# Patient Record
Sex: MALE | Race: WHITE | NOT HISPANIC OR LATINO | Employment: UNEMPLOYED | URBAN - METROPOLITAN AREA
[De-identification: names, ages, dates, MRNs, and addresses within clinical notes are randomized per-mention and may not be internally consistent; named-entity substitution may affect disease eponyms.]

---

## 2018-10-09 ENCOUNTER — HOSPITAL ENCOUNTER (EMERGENCY)
Facility: HOSPITAL | Age: 43
Discharge: HOME | End: 2018-10-09
Attending: EMERGENCY MEDICINE

## 2018-10-09 VITALS
TEMPERATURE: 97 F | BODY MASS INDEX: 27.09 KG/M2 | HEIGHT: 72 IN | OXYGEN SATURATION: 100 % | RESPIRATION RATE: 16 BRPM | HEART RATE: 80 BPM | SYSTOLIC BLOOD PRESSURE: 124 MMHG | DIASTOLIC BLOOD PRESSURE: 86 MMHG | WEIGHT: 200 LBS

## 2018-10-09 DIAGNOSIS — R11.2 NON-INTRACTABLE VOMITING WITH NAUSEA, UNSPECIFIED VOMITING TYPE: Primary | ICD-10-CM

## 2018-10-09 DIAGNOSIS — F10.10 ETOH ABUSE: ICD-10-CM

## 2018-10-09 LAB
ALBUMIN SERPL BCP-MCNC: 4.1 G/DL
ALP SERPL-CCNC: 134 U/L
ALT SERPL W/O P-5'-P-CCNC: 45 U/L
ANION GAP SERPL CALC-SCNC: 12 MMOL/L
AST SERPL-CCNC: 123 U/L
BASOPHILS # BLD AUTO: 0.02 K/UL
BASOPHILS NFR BLD: 0.3 %
BILIRUB SERPL-MCNC: 1.8 MG/DL
BUN SERPL-MCNC: 9 MG/DL
CALCIUM SERPL-MCNC: 9.2 MG/DL
CHLORIDE SERPL-SCNC: 98 MMOL/L
CO2 SERPL-SCNC: 23 MMOL/L
CREAT SERPL-MCNC: 0.8 MG/DL
DIFFERENTIAL METHOD: ABNORMAL
EOSINOPHIL # BLD AUTO: 0 K/UL
EOSINOPHIL NFR BLD: 0.1 %
ERYTHROCYTE [DISTWIDTH] IN BLOOD BY AUTOMATED COUNT: 12.3 %
EST. GFR  (AFRICAN AMERICAN): >60 ML/MIN/1.73 M^2
EST. GFR  (NON AFRICAN AMERICAN): >60 ML/MIN/1.73 M^2
ETHANOL SERPL-MCNC: <10 MG/DL
GLUCOSE SERPL-MCNC: 135 MG/DL
HCT VFR BLD AUTO: 43 %
HGB BLD-MCNC: 15.5 G/DL
IMM GRANULOCYTES # BLD AUTO: 0.02 K/UL
IMM GRANULOCYTES NFR BLD AUTO: 0.3 %
LIPASE SERPL-CCNC: 35 U/L
LYMPHOCYTES # BLD AUTO: 0.9 K/UL
LYMPHOCYTES NFR BLD: 10.8 %
MAGNESIUM SERPL-MCNC: 1.9 MG/DL
MCH RBC QN AUTO: 32.2 PG
MCHC RBC AUTO-ENTMCNC: 36 G/DL
MCV RBC AUTO: 89 FL
MONOCYTES # BLD AUTO: 0.5 K/UL
MONOCYTES NFR BLD: 5.7 %
NEUTROPHILS # BLD AUTO: 6.5 K/UL
NEUTROPHILS NFR BLD: 82.8 %
NRBC BLD-RTO: 0 /100 WBC
PHOSPHATE SERPL-MCNC: 2.2 MG/DL
PLATELET # BLD AUTO: 131 K/UL
PMV BLD AUTO: 10.1 FL
POTASSIUM SERPL-SCNC: 3.3 MMOL/L
PROT SERPL-MCNC: 8.4 G/DL
RBC # BLD AUTO: 4.82 M/UL
SODIUM SERPL-SCNC: 133 MMOL/L
WBC # BLD AUTO: 7.86 K/UL

## 2018-10-09 PROCEDURE — 84100 ASSAY OF PHOSPHORUS: CPT

## 2018-10-09 PROCEDURE — 83735 ASSAY OF MAGNESIUM: CPT

## 2018-10-09 PROCEDURE — 80053 COMPREHEN METABOLIC PANEL: CPT

## 2018-10-09 PROCEDURE — 96365 THER/PROPH/DIAG IV INF INIT: CPT

## 2018-10-09 PROCEDURE — 80320 DRUG SCREEN QUANTALCOHOLS: CPT

## 2018-10-09 PROCEDURE — 63600175 PHARM REV CODE 636 W HCPCS: Performed by: PHYSICIAN ASSISTANT

## 2018-10-09 PROCEDURE — 25000003 PHARM REV CODE 250: Performed by: PHYSICIAN ASSISTANT

## 2018-10-09 PROCEDURE — 96375 TX/PRO/DX INJ NEW DRUG ADDON: CPT

## 2018-10-09 PROCEDURE — 83690 ASSAY OF LIPASE: CPT

## 2018-10-09 PROCEDURE — 99282 EMERGENCY DEPT VISIT SF MDM: CPT | Mod: ,,, | Performed by: EMERGENCY MEDICINE

## 2018-10-09 PROCEDURE — 85025 COMPLETE CBC W/AUTO DIFF WBC: CPT

## 2018-10-09 PROCEDURE — 99284 EMERGENCY DEPT VISIT MOD MDM: CPT | Mod: 25

## 2018-10-09 RX ORDER — GABAPENTIN 300 MG/1
CAPSULE ORAL
Qty: 10 CAPSULE | Refills: 0 | Status: SHIPPED | OUTPATIENT
Start: 2018-10-09 | End: 2018-10-13

## 2018-10-09 RX ORDER — GABAPENTIN 300 MG/1
CAPSULE ORAL
Qty: 10 CAPSULE | Refills: 0 | Status: SHIPPED | OUTPATIENT
Start: 2018-10-09 | End: 2018-10-09 | Stop reason: SDUPTHER

## 2018-10-09 RX ORDER — ONDANSETRON 2 MG/ML
4 INJECTION INTRAMUSCULAR; INTRAVENOUS
Status: COMPLETED | OUTPATIENT
Start: 2018-10-09 | End: 2018-10-09

## 2018-10-09 RX ORDER — GABAPENTIN 300 MG/1
300 CAPSULE ORAL ONCE
Status: COMPLETED | OUTPATIENT
Start: 2018-10-09 | End: 2018-10-09

## 2018-10-09 RX ORDER — POTASSIUM CHLORIDE 20 MEQ/15ML
20 SOLUTION ORAL
Status: DISCONTINUED | OUTPATIENT
Start: 2018-10-09 | End: 2018-10-09

## 2018-10-09 RX ORDER — SODIUM,POTASSIUM PHOSPHATES 280-250MG
1 POWDER IN PACKET (EA) ORAL ONCE
Status: COMPLETED | OUTPATIENT
Start: 2018-10-09 | End: 2018-10-09

## 2018-10-09 RX ORDER — DIAZEPAM 5 MG/ML
5 INJECTION, SOLUTION INTRAMUSCULAR; INTRAVENOUS
Status: COMPLETED | OUTPATIENT
Start: 2018-10-09 | End: 2018-10-09

## 2018-10-09 RX ORDER — ONDANSETRON 8 MG/1
8 TABLET, ORALLY DISINTEGRATING ORAL EVERY 6 HOURS PRN
Qty: 15 TABLET | Refills: 0 | Status: SHIPPED | OUTPATIENT
Start: 2018-10-09

## 2018-10-09 RX ORDER — ONDANSETRON 8 MG/1
8 TABLET, ORALLY DISINTEGRATING ORAL EVERY 6 HOURS PRN
Qty: 15 TABLET | Refills: 0 | Status: SHIPPED | OUTPATIENT
Start: 2018-10-09 | End: 2018-10-09 | Stop reason: SDUPTHER

## 2018-10-09 RX ADMIN — DIAZEPAM 5 MG: 5 INJECTION, SOLUTION INTRAMUSCULAR; INTRAVENOUS at 01:10

## 2018-10-09 RX ADMIN — POTASSIUM & SODIUM PHOSPHATES POWDER PACK 280-160-250 MG 1 PACKET: 280-160-250 PACK at 04:10

## 2018-10-09 RX ADMIN — THERA TABS 1 TABLET: TAB at 03:10

## 2018-10-09 RX ADMIN — SODIUM CHLORIDE 1000 ML: 0.9 INJECTION, SOLUTION INTRAVENOUS at 01:10

## 2018-10-09 RX ADMIN — ONDANSETRON 4 MG: 2 INJECTION INTRAMUSCULAR; INTRAVENOUS at 01:10

## 2018-10-09 RX ADMIN — GABAPENTIN 300 MG: 300 CAPSULE ORAL at 05:10

## 2018-10-09 RX ADMIN — THIAMINE HYDROCHLORIDE 100 MG: 100 INJECTION, SOLUTION INTRAMUSCULAR; INTRAVENOUS at 04:10

## 2018-10-09 NOTE — ED NOTES
Pharmacy contacted of pt. Multivitamin not loaded in pyxis. Acknowledged and pharmacist states will fix problem.

## 2018-10-09 NOTE — ED PROVIDER NOTES
Encounter Date: 10/9/2018       History     Chief Complaint   Patient presents with    Nausea     Pt daily drinker of Etoh. Pt's last drink was noon yesterday. Pt has nausea/emesis/shakes.     42-year-old male with hepatitis-C and history of ETOH abuse presents for nausea/vomiting and alcohol withdrawal.  Patient states he normally drinks 1 L of vodka per day +12 to 24 beers.  He would like to stop drinking, last drink was yesterday at noon.  States that this morning he started to feel tremulous and nauseous with continuous vomiting. Unable to tolerate any p.o. intake.  Reports associated crampy abdominal pain and visual hallucinations of spiders crawling in his peripheral vision.  Patient has history of seizures from alcohol withdrawal approximately 4 years ago.          Review of patient's allergies indicates:  No Known Allergies  Past Medical History:   Diagnosis Date    DTs (delirium tremens)     Hypertension     Seizures      History reviewed. No pertinent surgical history.  History reviewed. No pertinent family history.  Social History     Tobacco Use    Smoking status: Current Every Day Smoker    Smokeless tobacco: Never Used   Substance Use Topics    Alcohol use: Yes     Comment: daily 1 liter heavy alcohol and 12 pack beer    Drug use: No     Review of Systems   Constitutional: Negative for chills, fatigue and fever.   Eyes: Negative for photophobia and visual disturbance.   Respiratory: Negative for cough and shortness of breath.    Cardiovascular: Negative for chest pain and palpitations.   Gastrointestinal: Positive for abdominal pain, blood in stool, nausea and vomiting. Negative for abdominal distention, anal bleeding, constipation, diarrhea and rectal pain.   Genitourinary: Negative for difficulty urinating, dysuria, flank pain, frequency, hematuria and urgency.   Musculoskeletal: Positive for back pain. Negative for gait problem, myalgias, neck pain and neck stiffness.   Skin: Negative for  wound.   Neurological: Positive for tremors and headaches. Negative for seizures, syncope, weakness, light-headedness and numbness.   Psychiatric/Behavioral: Positive for hallucinations. Negative for dysphoric mood, self-injury and suicidal ideas. The patient is nervous/anxious.        Physical Exam     Initial Vitals   BP Pulse Resp Temp SpO2   10/09/18 1225 10/09/18 1225 10/09/18 1225 10/09/18 1227 10/09/18 1225   (!) 138/100 106 18 98 °F (36.7 °C) 99 %      MAP       --                Physical Exam    Nursing note and vitals reviewed.  Constitutional: He appears well-developed and well-nourished. He is not diaphoretic. No distress.   Smells strongly of urine   HENT:   Head: Normocephalic and atraumatic.   Eyes: EOM are normal. Pupils are equal, round, and reactive to light.   Neck: Normal range of motion. Neck supple.   Cardiovascular: Regular rhythm, normal heart sounds and intact distal pulses. Exam reveals no gallop and no friction rub.    No murmur heard.  Tachycardic   Pulmonary/Chest: Breath sounds normal. No respiratory distress. He has no wheezes. He has no rhonchi. He has no rales. He exhibits no tenderness.   Abdominal: Soft. Bowel sounds are normal. He exhibits no distension and no mass. There is no tenderness. There is no rebound and no guarding.   Genitourinary: Rectal exam shows no external hemorrhoid, no fissure and guaiac negative stool. Guaiac negative stool. : Acceptable.  Genitourinary Comments: RN present as chaperone for rectal exam   Musculoskeletal: Normal range of motion.   Neurological: He is alert and oriented to person, place, and time. He has normal strength.   Tremulous with arms extended   Skin: Skin is warm and dry.   Psychiatric: He has a normal mood and affect. His speech is normal and behavior is normal. Thought content normal. He is actively hallucinating. He expresses no homicidal and no suicidal ideation. He expresses no suicidal plans. He is attentive.          ED Course   Procedures  Labs Reviewed   COMPREHENSIVE METABOLIC PANEL - Abnormal; Notable for the following components:       Result Value    Sodium 133 (*)     Potassium 3.3 (*)     Glucose 135 (*)     Total Bilirubin 1.8 (*)      (*)     ALT 45 (*)     All other components within normal limits   CBC W/ AUTO DIFFERENTIAL - Abnormal; Notable for the following components:    MCH 32.2 (*)     Platelets 131 (*)     Lymph # 0.9 (*)     Gran% 82.8 (*)     Lymph% 10.8 (*)     All other components within normal limits   PHOSPHORUS - Abnormal; Notable for the following components:    Phosphorus 2.2 (*)     All other components within normal limits   MAGNESIUM   LIPASE   ALCOHOL,MEDICAL (ETHANOL)   URINALYSIS, REFLEX TO URINE CULTURE          Imaging Results    None          Medical Decision Making:   History:   Old Medical Records: I decided to obtain old medical records.  Clinical Tests:   Lab Tests: Ordered and Reviewed  Other:   I have discussed this case with another health care provider.       APC / Resident Notes:   42-year-old male presenting with nausea/vomiting, visual hallucinations and tremors after quitting drinking.  Diastolic hypertension on exam at 138/100, mildly tachycardic a 106. Hands tremulous with arms extended.  Abdomen is soft and nontender with normoactive bowel sounds. DDX includes alcohol withdrawal, dehydration, electrolyte derangement.  Patient given fluids, diazepam, Zofran with improvement of symptoms. Will give IV thiamine, multivitamin and reassess.    Labs notable for hypokalemia potassium of 3.3, hypophosphatemia 2.2.  Will replete.  T bili, AST, ALT elevated, suspect secondary to hepatitis-C/ETOH use.  Ethanol less than 10.  Given rapid improvement, I do not suspect severe withdrawal.  Will discharge with Zofran for nausea, gabapentin for withdrawal symptoms and resources for ETOH detox.  Stressed the importance of follow-up, strict ED return precautions given, specifically  related to DTs and seizures.  Patient voiced understanding and is comfortable with discharge. I discussed this patient with my supervising physician.    Ariadna Gr PA-C           Attending Attestation:     Physician Attestation Statement for NP/PA:   I have conducted a face to face encounter with this patient in addition to the NP/PA, due to Medical Complexity    Other NP/PA Attestation Additions:    History of Present Illness: States past hx of DT's, last admit was over a year ago (at [facility]).  Had been sober for approx 9mos after that.  Been drinking again for about 3 mos.  No blood in vomit.      At time of my eval, after meds/fluids, pt states feels substantially better.  No longer shaky, no hallucinations, no vomiting.  Has eaten and states that helped a lot.  He is homeless presently.  States he hopes to go to the [facility] for assistance in outpt alcohol rehab, which he has done before.     Physical Exam: Nad, wdwn  Nc/at  Perrl, eomi, no nystagmus, anicteric  Dry mm  ctab  Rrr, nl s1/2  abd benign  No edema  No rash  aox3  No tremor, clear speech, no gross focal deficits  Nl affect, no si/hi   Medical Decision Making: Imp: etoh abuse, last drink yesterday, w/ tremulousness and n/v.  On present exam, is not showing any signs of withdrawal and reports feeling much better here.  Will continue w/ fluids, replete lytes, give dose of thiamine/mvi, monitor and re-eval.  If remains w/o any signs/sxs of active withdrawal, anticipate d/c to proceed w/ outpt etoh rx as he plans (and for which he is expressing motivation to pursue).                      Clinical Impression:   The primary encounter diagnosis was Non-intractable vomiting with nausea, unspecified vomiting type. A diagnosis of ETOH abuse was also pertinent to this visit.      Disposition:   Disposition: Discharged  Condition: Stable                        Ariadna Gr PA-C  10/09/18 [id number]

## 2018-10-09 NOTE — DISCHARGE INSTRUCTIONS
Please follow up with an alcohol detox program. If you start to have severe shaking, continued hallucinations or are unable to keep down any food or drink, please come back to the Emergency Department.

## 2018-10-09 NOTE — ED TRIAGE NOTES
"Patient arrives via EMS, states he is a "pretty hard core alcohol"  1 liter of hard alcohol and 12 pack to 1 case of beer daily, with h/o seizures from DTs. States unable to hold down alcohol, emesis x5 today, last time he was able to keep alcohol down was yesterday at 1200 States blood in feces. States bright red stool today on wipe and in toilet. Denies coffee ground in emesis States he is hallucinating, seeing "spiders"   "

## 2018-10-09 NOTE — ED NOTES
Hourly rounding complete. Patient resting in stretcher and is in NAD at this time. Pt is awake alert and oriented x4, VSS, respirations even and unlabored. Pt denies pain at this time. Pt updated on POC. Bed low and locked with side rails up x2, call bell in pt reach. Pt voices no needs at this time.

## 2018-10-09 NOTE — ED NOTES
"Patient identifiers verified and correct for Mr Tony  C/C: Vomiting with heavy alcohol use, h/o seizures with DTs.   APPEARANCE: awake and alert in NAD. Shaking, states he is hallucinating, seeing "spiders"   SKIN: warm, dry and intact. No breakdown or bruising.  MUSCULOSKELETAL: Patient moving all extremities spontaneously, no obvious swelling or deformities noted. Ambulates independently.  RESPIRATORY: Denies shortness of breath.Respirations unlabored.   CARDIAC: Denies CP, 2+ distal pulses; no peripheral edema  ABDOMEN: ABdomen soft, positive nausea, emesis, states blood in stool and in toilet.   : voids spontaneously, denies difficulty  Neurologic: AAO x 4; follows commands equal strength in all extremities; denies numbness/tingling. Denies dizziness Positive weakness    "

## 2018-10-09 NOTE — PROGRESS NOTES
42-year-old male with a history of alcohol abuse presents to the ED complaining of alcohol withdrawal symptoms. Patient reports nausea, vomiting and inability to tolerate PO intake since waking this morning.  He also reports tremors and visual hallucinations stating that he sees spiders in his vision.  He reports some mild bilateral mid abdominal pain. Patient also noted 1 episode of bright red blood per rectum when he wiped.  He did note a small amount of bright red blood in the stool.  He denies SI, HI, auditory hallucinations.  Patient reports a history of DTs and seizures 1 previously withdrawing from alcohol.  Last seizure was approximately 1 year ago.  He denies any recent seizures.     Patient mildly tachycardic on exam.  Diastolic BP is elevated.  Patient is tremulous.     I initially evaluated this patient and ordered workup while in intake.  The patient will receive a full evaluation in an ED pod when space is available.  All results from tests ordered in intake will not be followed by the intake team, including myself. All results will be followed by the ED Pod team.

## 2019-05-15 ENCOUNTER — HOSPITAL ENCOUNTER (OUTPATIENT)
Dept: MEDSURG UNIT | Facility: HOSPITAL | Age: 44
End: 2019-05-16
Attending: SURGERY | Admitting: SURGERY

## 2019-05-15 LAB
ABS NEUT (OLG): 2.98 X10(3)/MCL (ref 2.1–9.2)
ALBUMIN SERPL-MCNC: 3.7 GM/DL (ref 3.4–5)
ALBUMIN/GLOB SERPL: 0.8 RATIO (ref 1.1–2)
ALP SERPL-CCNC: 135 UNIT/L (ref 50–136)
ALT SERPL-CCNC: 104 UNIT/L (ref 12–78)
AMPHET UR QL SCN: NORMAL
APPEARANCE, UA: CLEAR
APTT PPP: 32.4 SECOND(S) (ref 24.2–33.9)
AST SERPL-CCNC: 123 UNIT/L (ref 15–37)
BACTERIA SPEC CULT: NORMAL /HPF
BARBITURATE SCN PRESENT UR: NORMAL
BASOPHILS # BLD AUTO: 0 X10(3)/MCL (ref 0–0.2)
BASOPHILS NFR BLD AUTO: 0 %
BENZODIAZ UR QL SCN: NORMAL
BILIRUB SERPL-MCNC: 0.3 MG/DL (ref 0.2–1)
BILIRUB UR QL STRIP: NEGATIVE
BILIRUBIN DIRECT+TOT PNL SERPL-MCNC: 0.1 MG/DL (ref 0–0.5)
BILIRUBIN DIRECT+TOT PNL SERPL-MCNC: 0.2 MG/DL (ref 0–0.8)
BUN SERPL-MCNC: 7 MG/DL (ref 7–18)
CALCIUM SERPL-MCNC: 8.3 MG/DL (ref 8.5–10.1)
CANNABINOIDS UR QL SCN: NORMAL
CHLORIDE SERPL-SCNC: 107 MMOL/L (ref 98–107)
CO2 SERPL-SCNC: 23 MMOL/L (ref 21–32)
COCAINE UR QL SCN: NORMAL
COLOR UR: YELLOW
CREAT SERPL-MCNC: 1.12 MG/DL (ref 0.7–1.3)
EOSINOPHIL # BLD AUTO: 0.1 X10(3)/MCL (ref 0–0.9)
EOSINOPHIL NFR BLD AUTO: 1 %
ERYTHROCYTE [DISTWIDTH] IN BLOOD BY AUTOMATED COUNT: 12.7 % (ref 11.5–17)
ETHANOL SERPL-MCNC: 387 MG/DL (ref 0–3)
GLOBULIN SER-MCNC: 4.6 GM/DL (ref 2.4–3.5)
GLUCOSE (UA): NEGATIVE
GLUCOSE SERPL-MCNC: 105 MG/DL (ref 74–106)
HCT VFR BLD AUTO: 43.8 % (ref 42–52)
HGB BLD-MCNC: 14.3 GM/DL (ref 14–18)
HGB UR QL STRIP: NEGATIVE
INR PPP: 1.1 (ref 0–1.3)
KETONES UR QL STRIP: NEGATIVE
LEUKOCYTE ESTERASE UR QL STRIP: NEGATIVE
LYMPHOCYTES # BLD AUTO: 4.7 X10(3)/MCL (ref 0.6–4.6)
LYMPHOCYTES NFR BLD AUTO: 55 %
MCH RBC QN AUTO: 31.4 PG (ref 27–31)
MCHC RBC AUTO-ENTMCNC: 32.6 GM/DL (ref 33–36)
MCV RBC AUTO: 96.1 FL (ref 80–94)
MONOCYTES # BLD AUTO: 0.8 X10(3)/MCL (ref 0.1–1.3)
MONOCYTES NFR BLD AUTO: 9 %
NEUTROPHILS # BLD AUTO: 2.98 X10(3)/MCL (ref 2.1–9.2)
NEUTROPHILS NFR BLD AUTO: 35 %
NITRITE UR QL STRIP: NEGATIVE
OPIATES UR QL SCN: NORMAL
PCP UR QL: NORMAL
PH UR STRIP.AUTO: 5 [PH] (ref 5–7.5)
PH UR STRIP: 5 [PH] (ref 5–9)
PLATELET # BLD AUTO: 292 X10(3)/MCL (ref 130–400)
PMV BLD AUTO: 9.2 FL (ref 9.4–12.4)
POTASSIUM SERPL-SCNC: 3.8 MMOL/L (ref 3.5–5.1)
PROT SERPL-MCNC: 8.3 GM/DL (ref 6.4–8.2)
PROT UR QL STRIP: NEGATIVE
PROTHROMBIN TIME: 14.4 SECOND(S) (ref 12–14)
RBC # BLD AUTO: 4.56 X10(6)/MCL (ref 4.7–6.1)
RBC #/AREA URNS HPF: NORMAL /[HPF]
SODIUM SERPL-SCNC: 139 MMOL/L (ref 136–145)
SP GR FLD REFRACTOMETRY: 1.01 (ref 1–1.03)
SP GR UR STRIP: 1.01 (ref 1–1.03)
SQUAMOUS EPITHELIAL, UA: NORMAL
UROBILINOGEN UR STRIP-ACNC: 0.2
WBC # SPEC AUTO: 8.6 X10(3)/MCL (ref 4.5–11.5)
WBC #/AREA URNS HPF: NORMAL /[HPF]

## 2019-05-16 LAB
LACTATE SERPL-SCNC: 1.8 MMOL/L (ref 0.4–2)
LACTATE SERPL-SCNC: 2 MMOL/L (ref 0.4–2)

## 2022-05-03 NOTE — HISTORICAL [facility] CERNER
This is a historical note converted from Aj. Formatting and pictures may have been removed.  Please reference Ceruriel for original formatting and attached multimedia. Chief Complaint  Level 2 trauma activation. see trauma flowsheet.  History of Present Illness  42 yo M with PMH of alcoholism and Hep C (untreated) presents to the ED after falling off of his skateboard.? He states that he did hit his head when he fell.? He is unsure of any LOC.? In the ED, he is complaining of left shoulder pain, lower back pain and tingling in his hands b/l.? He states that he has a history of alcohol abuse and drinks a fifth of hard liquor daily.? He has had seizure and hallucinations?2/2 alcohol withdrawal in the past.? Per the ED report, he?has been belligerent and has removed his c-collar several times despite being counseled to keep his c-collar on.  Review of Systems  12 point ROS negative except as stated in HPI  Physical Exam  Vitals & Measurements  T:?36.7? ?C (Oral)? HR:?88(Peripheral)? HR:?88(Monitored)? RR:?22? BP:?136/78? SpO2:?99%? WT:?97.5?kg?  Gen: NAD. AA  Neuro: CN grossly intact, moving all extremities  HEENT: pinpoint pupils, EOMI, PERRL, MMM  CV: RRR, 2+ DP/PT/radial pulses b/l  Pulm: no increased wob, chest expansion equal b/l  Abd: s/ nt/nd/no rebound/no guarding  MSK: sensation and motor intact  Integ: laceration?above the right eyebrow, 2x2cm abrasion to his right knee  Assessment/Plan  42 yo intoxicated M presenting as level 2 trauma after falling from skateboard  ?  -admit for observation  -serial neuro examinations  -serial abdominal exams  -continue c-collar  -Serax protocol initiated  -will follow up final imaging results  -trend labs  ?  Jaclyn Rose  General Surgery, [facility]   Problem List/Past Medical History  Ongoing  Alcohol withdrawal-induced seizure  Alcohol-induced hallucinations  Hepatitis C virus  Historical  No qualifying data  Procedure/Surgical History  rotator cuff  sx  Medications  Inpatient  acetaminophen Oral TAB, 650 mg= 2 tab(s), Oral, q4hr, PRN  ibuprofen 800 mg oral tablet, 800 mg= 1 tab(s), Oral, TID, PRN  IVF Lactated Ringers LR Infusion 1,000 mL, 1000 mL, IV  loperamide 2 mg oral tablet, 2 mg, Oral, q4hr, PRN  Lovenox 30 mg/0.3 mL subcutaneous solution, 30 mg= 0.3 mL, Subcutaneous, q12hr  Multivitamin Tab (with minerals), 1 tab(s), Oral, Daily  Norco 5 mg-325 mg oral tablet, 1 tab(s), Oral, q6hr, PRN  oxazepam 15 mg oral capsule, 30 mg= 2 cap(s), Oral, q6hr, PRN  oxazepam 15 mg oral capsule, 15 mg= 1 cap(s), Oral, q8hr  oxazepam 15 mg oral capsule, 15 mg= 1 cap(s), Oral, q12hr  oxazepam 15 mg oral capsule, 15 mg= 1 cap(s), Oral, q6hr  oxazepam 15 mg oral capsule, 30 mg= 2 cap(s), Oral, q6hr  oxazepam 15 mg oral capsule, 30 mg= 2 cap(s), Oral, q8hr  thiamine 100 mg oral tablet, 100 mg= 1 tab(s), Oral, Daily  Zofran INJ. (IV Push / IM) 2 mg/mL, 4 mg= 2 mL, IV Push, q8hr, PRN  Zofran ODT, 8 mg= 2 tab(s), Oral, q8hr, PRN  Home  doxepin 25 mg oral capsule, 25 mg= 1 cap(s), Oral, Once a day (at bedtime), 1 refills,? ?Not taking  mirtazapine 15 mg oral tablet, 15 mg= 1 tab(s), Oral, qPM,? ?Not taking  prazosin 1 mg oral capsule, 1 mg= 1 cap(s), Oral, qPM,? ?Not taking  Zoloft 100 mg oral tablet, 100 mg= 1 tab(s), Oral, Daily, 1 refills,? ?Not taking  Allergies  No Known Medication Allergies  Social History  Alcohol  Current, Beer, Liquor, Daily, 04/23/2019  Current, Beer, Daily, 5 drinks/episode average. 10 drinks/episode maximum. Started age 15 Years. Previous treatment: Inpatient. Alcohol use interferes with work or home: No. Drinks more than intended: No. Others hurt by drinking: No. Ready to change: No., 04/08/2019  Tobacco  Never (less than 100 in lifetime), N/A, Previous treatment: None. Household tobacco concerns: No. Smokeless Tobacco Use: Never., 04/08/2019  Family History  Father-liver/kidney failure  Lab Results  Labs Last 24 Hours?  ?Chemistry?  Hematology/Coagulation?   Sodium Lvl: 139 mmol/L (05/15/19 22:31:00) PT:?14.4 second(s)?High (05/15/19 22:31:00)   Potassium Lvl: 3.8 mmol/L (05/15/19 22:31:00) INR: 1.1 (05/15/19 22:31:00)   Chloride: 107 mmol/L (05/15/19 22:31:00) PTT: 32.4 second(s) (05/15/19 22:31:00)   CO2: 23 mmol/L (05/15/19 22:31:00) WBC: 8.6 x10(3)/mcL (05/15/19 22:31:00)   Calcium Lvl:?8.3 mg/dL?Low (05/15/19 22:31:00) RBC:?4.56 x10(6)/mcL?Low (05/15/19 22:31:00)   Glucose Lvl: 105 mg/dL (05/15/19 22:31:00) Hgb: 14.3 gm/dL (05/15/19 22:31:00)   BUN: 7 mg/dL (05/15/19 22:31:00) Hct: 43.8 % (05/15/19 22:31:00)   Creatinine: 1.12 mg/dL (05/15/19 22:31:00) Platelet: 292 x10(3)/mcL (05/15/19 22:31:00)   eGFR-AA: >60 (05/15/19 22:31:00) MCV:?96.1 fL?High (05/15/19 22:31:00)   eGFR-YAMIL: >60 (05/15/19 22:31:00) MCH:?31.4 pg?High (05/15/19 22:31:00)   Bili Total: 0.3 mg/dL (05/15/19 22:31:00) MCHC:?32.6 gm/dL?Low (05/15/19 22:31:00)   Bili Direct: 0.1 mg/dL (05/15/19 22:31:00) RDW: 12.7 % (05/15/19 22:31:00)   Bili Indirect: 0.2 mg/dL (05/15/19 22:31:00) MPV:?9.2 fL?Low (05/15/19 22:31:00)   AST:?123 unit/L?High (05/15/19 22:31:00) Abs Neut: 2.98 x10(3)/mcL (05/15/19 22:31:00)   ALT:?104 unit/L?High (05/15/19 22:31:00) Neutro Auto: 35 % (05/15/19 22:31:00)   Alk Phos: 135 unit/L (05/15/19 22:31:00) Lymph Auto: 55 % (05/15/19 22:31:00)   Total Protein:?8.3 gm/dL?High (05/15/19 22:31:00) Mono Auto: 9 % (05/15/19 22:31:00)   Albumin Lvl: 3.7 gm/dL (05/15/19 22:31:00) Eos Auto: 1 % (05/15/19 22:31:00)   Globulin:?4.6 gm/dL?High (05/15/19 22:31:00) Abs Eos: 0.1 x10(3)/mcL (05/15/19 22:31:00)   A/G Ratio:?0.8 ratio?Low (05/15/19 22:31:00) Basophil Auto: 0 % (05/15/19 22:31:00)   U pH: 5 (05/15/19 23:22:00) Abs Neutro: 2.98 x10(3)/mcL (05/15/19 22:31:00)   U Spec Grav: 1.007 (05/15/19 23:22:00) Abs Lymph:?4.7 x10(3)/mcL?High (05/15/19 22:31:00)    Abs Mono: 0.8 x10(3)/mcL (05/15/19 22:31:00)    Abs Baso: 0 x10(3)/mcL (05/15/19 22:31:00)   ?  ?  Diagnostic  Results  CT Head-preliminary read: no acute intracranial traumatic injury  ?  CT c-spine-preliminary read: no acute fracture, dislocation, or subluxation  ?  CT max/face-preliminary read:  -mild left periorbital soft tissue swell in the preseptal region  -?soft tissue edema in the left malar region  -minimally displaced fracture of the left nasal bone possibly non-acute  -no acute maxillofacial fracture  ?  CT L spine:  -no acute bony traumatic changes  -mild diffuse disc bulges are seen L3-L4 through L5-S1, causing mild spinal canal narrowing, moderate to severe neuroforaminal narrowing at L5-S1 with likely impingement of L5 nerve roots  ?  right shoulder XR: pending  ?  right Knee XR: pending      Agree with above

## 2022-05-03 NOTE — HISTORICAL [facility] CERNER
This is a historical note converted from Aj. Formatting and pictures may have been removed.  Please reference Aj for original formatting and attached multimedia. Admit Date: 5/15/19  ?   Discharge date: 5/16/19  ?   Admitting Physician: Ladarius Hong MD  ?   Discharge Physician: Gary Harrell MD  ?  Admission Diagnosis: blunt trauma, alcohol intoxication  ?  Discharge diagnosis: same  ?  Admission Condition: stable  ?  Discharge condition: stable  ?  Hospital Course:  42 yo intoxicated male?presented to the hospital following a fall from his skateboard.? He was unsure about loss of consciousness.? Complained of lower back pain and shoulder pain.? CT head/c-spine/abdomen/pelvis, XR?right shoulder and XR right knee?were completed and he showed no acute injuries. Today, his pain is well controlled and he is tolerating a diet.? No additional injuries have been identified.? He is ready for discharge.? He will need to follow up in the acute care surgery clinic in 3-4 weeks.  ?  Consults: none  ?  Diagnostic Studies:  CT Head:  Impression  No acute intracranial abnormality identified.  Please refer to dedicated maxillofacial CT.  ?  CT c-spine:  Impression:  1. No acute cervical spine abnormality identified.  2. Ligament, spinal cord and/or vascular abnormalities cannot be  excluded on the basis of this examination.  ?  CT max/face:  Impression  No acute facial fracture identified. Chronic appearing left nasal bone  fracture is noted. Laceration overlies the right frontal bone with no  underlying fracture.  ?  Right shoulder XR:  IMPRESSION:  No fracture or dislocation.  Obliteration of the subacromial space. This finding strongly suggest  rotator cuff tear. MRI may be used to confirm if clinically indicated.  ?  XR R knee:  IMPRESSION  1. ?Mild prepatellar soft tissue swelling.  2. ?No evidence of acute osseous disruption.  ?  Treatments: observation, serial abdominal exams, Serax protocol  ?  Disposition: To  home/self care  ?  Activity: As tolerated  ?  Diet: Ad vladimir  ?  Wound Care: keep wounds clean and dry, change dressings daily  ?  Follow-up: with acute care surgery team in 3-4 weeks, fu with orthopaedic surgery as an outpatient if shoulder pain persists  ?  Plan discussed with patient and all questions answered  ?  Jaclyn Rose  General Surgery, [facility]   agree with above assessment and plan  ?